# Patient Record
Sex: FEMALE | Race: WHITE | NOT HISPANIC OR LATINO | URBAN - METROPOLITAN AREA
[De-identification: names, ages, dates, MRNs, and addresses within clinical notes are randomized per-mention and may not be internally consistent; named-entity substitution may affect disease eponyms.]

---

## 2021-09-21 ENCOUNTER — OFFICE (OUTPATIENT)
Dept: URBAN - METROPOLITAN AREA CLINIC 19 | Facility: CLINIC | Age: 86
End: 2021-09-21
Payer: COMMERCIAL

## 2021-09-21 VITALS
OXYGEN SATURATION: 94 % | HEIGHT: 62 IN | DIASTOLIC BLOOD PRESSURE: 41 MMHG | WEIGHT: 106 LBS | SYSTOLIC BLOOD PRESSURE: 109 MMHG | HEART RATE: 70 BPM

## 2021-09-21 DIAGNOSIS — R10.9 UNSPECIFIED ABDOMINAL PAIN: ICD-10-CM

## 2021-09-21 DIAGNOSIS — K59.00 CONSTIPATION, UNSPECIFIED: ICD-10-CM

## 2021-09-21 DIAGNOSIS — N39.0 URINARY TRACT INFECTION, SITE NOT SPECIFIED: ICD-10-CM

## 2021-09-21 LAB
COMP. METABOLIC PANEL (14): A/G RATIO: 1.7 (ref 1.2–2.2)
COMP. METABOLIC PANEL (14): ALBUMIN: 4.4 G/DL (ref 3.6–4.6)
COMP. METABOLIC PANEL (14): ALKALINE PHOSPHATASE: 68 IU/L (ref 44–121)
COMP. METABOLIC PANEL (14): ALT (SGPT): 10 IU/L (ref 0–32)
COMP. METABOLIC PANEL (14): AST (SGOT): 18 IU/L (ref 0–40)
COMP. METABOLIC PANEL (14): BILIRUBIN, TOTAL: 0.2 MG/DL (ref 0–1.2)
COMP. METABOLIC PANEL (14): BUN/CREATININE RATIO: 19 (ref 12–28)
COMP. METABOLIC PANEL (14): BUN: 15 MG/DL (ref 8–27)
COMP. METABOLIC PANEL (14): CALCIUM: 9.4 MG/DL (ref 8.7–10.3)
COMP. METABOLIC PANEL (14): CARBON DIOXIDE, TOTAL: 24 MMOL/L (ref 20–29)
COMP. METABOLIC PANEL (14): CHLORIDE: 104 MMOL/L (ref 96–106)
COMP. METABOLIC PANEL (14): CREATININE: 0.77 MG/DL (ref 0.57–1)
COMP. METABOLIC PANEL (14): EGFR IF AFRICN AM: 82 ML/MIN/1.73 (ref 59–?)
COMP. METABOLIC PANEL (14): EGFR IF NONAFRICN AM: 71 ML/MIN/1.73 (ref 59–?)
COMP. METABOLIC PANEL (14): GLOBULIN, TOTAL: 2.6 G/DL (ref 1.5–4.5)
COMP. METABOLIC PANEL (14): GLUCOSE: 93 MG/DL (ref 65–99)
COMP. METABOLIC PANEL (14): POTASSIUM: 4 MMOL/L (ref 3.5–5.2)
COMP. METABOLIC PANEL (14): PROTEIN, TOTAL: 7 G/DL (ref 6–8.5)
COMP. METABOLIC PANEL (14): SODIUM: 140 MMOL/L (ref 134–144)

## 2021-09-21 PROCEDURE — 99204 OFFICE O/P NEW MOD 45 MIN: CPT | Performed by: INTERNAL MEDICINE

## 2021-09-22 LAB
MICROSCOPIC EXAMINATION: BACTERIA: (no result)
MICROSCOPIC EXAMINATION: CASTS: (no result) /LPF
MICROSCOPIC EXAMINATION: EPITHELIAL CELLS (NON RENAL): (no result) /HPF
MICROSCOPIC EXAMINATION: RBC: (no result) /HPF
MICROSCOPIC EXAMINATION: WBC: (no result) /HPF
REQUEST PROBLEM: (no result)
URINALYSIS, COMPLETE: APPEARANCE: CLEAR
URINALYSIS, COMPLETE: BILIRUBIN: NEGATIVE
URINALYSIS, COMPLETE: GLUCOSE: NEGATIVE
URINALYSIS, COMPLETE: KETONES: NEGATIVE
URINALYSIS, COMPLETE: MICROSCOPIC EXAMINATION: (no result)
URINALYSIS, COMPLETE: NITRITE, URINE: NEGATIVE
URINALYSIS, COMPLETE: OCCULT BLOOD: NEGATIVE
URINALYSIS, COMPLETE: PH: 5 (ref 5–7.5)
URINALYSIS, COMPLETE: PROTEIN: NEGATIVE
URINALYSIS, COMPLETE: SPECIFIC GRAVITY: 1.02 (ref 1–1.03)
URINALYSIS, COMPLETE: URINE-COLOR: YELLOW
URINALYSIS, COMPLETE: UROBILINOGEN,SEMI-QN: 0.2 MG/DL (ref 0.2–1)
URINALYSIS, COMPLETE: WBC ESTERASE: ABNORMAL
URINE CULTURE,COMPREHENSIVE: (no result)

## 2023-12-14 ENCOUNTER — OFFICE (OUTPATIENT)
Dept: URBAN - METROPOLITAN AREA CLINIC 19 | Facility: CLINIC | Age: 88
End: 2023-12-14
Payer: MEDICARE

## 2023-12-14 VITALS
HEART RATE: 99 BPM | DIASTOLIC BLOOD PRESSURE: 93 MMHG | HEIGHT: 62 IN | SYSTOLIC BLOOD PRESSURE: 117 MMHG | OXYGEN SATURATION: 100 % | WEIGHT: 102 LBS

## 2023-12-14 DIAGNOSIS — Z86.010 PERSONAL HISTORY OF COLONIC POLYPS: ICD-10-CM

## 2023-12-14 DIAGNOSIS — R51.9 HEADACHE, UNSPECIFIED: ICD-10-CM

## 2023-12-14 DIAGNOSIS — R14.0 ABDOMINAL DISTENSION (GASEOUS): ICD-10-CM

## 2023-12-14 DIAGNOSIS — R11.0 NAUSEA: ICD-10-CM

## 2023-12-14 DIAGNOSIS — K59.00 CONSTIPATION, UNSPECIFIED: ICD-10-CM

## 2023-12-14 DIAGNOSIS — K56.41 FECAL IMPACTION: ICD-10-CM

## 2023-12-14 PROCEDURE — 99214 OFFICE O/P EST MOD 30 MIN: CPT | Performed by: NURSE PRACTITIONER

## 2023-12-14 RX ORDER — LINACLOTIDE 145 UG/1
CAPSULE, GELATIN COATED ORAL
Qty: 30 | Refills: 11 | Status: COMPLETED
Start: 2023-12-14 | End: 2024-01-25

## 2023-12-14 NOTE — SERVICEHPINOTES
Ms. Mccullough is an 88-year-old female with PMH including OA, colon polyps, chronic constipation, chronic HA's.
br
kimi She presents to clinic today with c/o constipation and nausea. She was previously seen by Dr. Correa. Her  is with her today.
br
Rubéntates that she has a "bad stomach problem. " She will go a week or more without a bowel movement.  She also feels sick to her stomach at this time and can have nausea.  She is fatigued and has bloating.  She has been using Senokot for her bowel movements only.  She has some mild anorexia which she believes may be due to the constipation.
br
brNo melena, hematochezia, rectal pain, dysphagia, odynophagia, nausea/vomiting, urinary symptoms.  She will manually disimpact in order to have a bowel movement.
kimi Montanatates she has been on Nurtec for her headaches.  She has bilateral swelling in her hands from her arthritis. Her  believes she had imaging done of the abdomen with Ángel Steel NP. I will request this.  States she feels the stool sitting in rectum but has to manually disimpact.
kimi
brCOLONOSCOPY 2021: Moderate internal hemorrhoids, pandiverticulosis, colon polyps, adequate prep. Pathology revealed 3 tubular adenomas without high-grade dysplasia, 1 hyperplastic polyp.

## 2023-12-15 LAB
CBC WITH DIFFERENTIAL/PLATELET: BASO (ABSOLUTE): 0.1 X10E3/UL (ref 0–0.2)
CBC WITH DIFFERENTIAL/PLATELET: BASOS: 1 %
CBC WITH DIFFERENTIAL/PLATELET: EOS (ABSOLUTE): 0.1 X10E3/UL (ref 0–0.4)
CBC WITH DIFFERENTIAL/PLATELET: EOS: 2 %
CBC WITH DIFFERENTIAL/PLATELET: HEMATOCRIT: 36.7 % (ref 34–46.6)
CBC WITH DIFFERENTIAL/PLATELET: HEMOGLOBIN: 12.1 G/DL (ref 11.1–15.9)
CBC WITH DIFFERENTIAL/PLATELET: IMMATURE GRANS (ABS): 0 X10E3/UL (ref 0–0.1)
CBC WITH DIFFERENTIAL/PLATELET: IMMATURE GRANULOCYTES: 0 %
CBC WITH DIFFERENTIAL/PLATELET: LYMPHS (ABSOLUTE): 1.7 X10E3/UL (ref 0.7–3.1)
CBC WITH DIFFERENTIAL/PLATELET: LYMPHS: 30 %
CBC WITH DIFFERENTIAL/PLATELET: MCH: 31 PG (ref 26.6–33)
CBC WITH DIFFERENTIAL/PLATELET: MCHC: 33 G/DL (ref 31.5–35.7)
CBC WITH DIFFERENTIAL/PLATELET: MCV: 94 FL (ref 79–97)
CBC WITH DIFFERENTIAL/PLATELET: MONOCYTES(ABSOLUTE): 0.6 X10E3/UL (ref 0.1–0.9)
CBC WITH DIFFERENTIAL/PLATELET: MONOCYTES: 10 %
CBC WITH DIFFERENTIAL/PLATELET: NEUTROPHILS (ABSOLUTE): 3.2 X10E3/UL (ref 1.4–7)
CBC WITH DIFFERENTIAL/PLATELET: NEUTROPHILS: 57 %
CBC WITH DIFFERENTIAL/PLATELET: PLATELETS: 231 X10E3/UL (ref 150–450)
CBC WITH DIFFERENTIAL/PLATELET: RBC: 3.9 X10E6/UL (ref 3.77–5.28)
CBC WITH DIFFERENTIAL/PLATELET: RDW: 12.5 % (ref 11.7–15.4)
CBC WITH DIFFERENTIAL/PLATELET: WBC: 5.6 X10E3/UL (ref 3.4–10.8)
COMP. METABOLIC PANEL (14): A/G RATIO: 1.7 (ref 1.2–2.2)
COMP. METABOLIC PANEL (14): ALBUMIN: 4.1 G/DL (ref 3.7–4.7)
COMP. METABOLIC PANEL (14): ALKALINE PHOSPHATASE: 69 IU/L (ref 44–121)
COMP. METABOLIC PANEL (14): ALT (SGPT): 10 IU/L (ref 0–32)
COMP. METABOLIC PANEL (14): AST (SGOT): 17 IU/L (ref 0–40)
COMP. METABOLIC PANEL (14): BILIRUBIN, TOTAL: <0.2 MG/DL
COMP. METABOLIC PANEL (14): BUN/CREATININE RATIO: 41 — HIGH (ref 12–28)
COMP. METABOLIC PANEL (14): BUN: 30 MG/DL — HIGH (ref 8–27)
COMP. METABOLIC PANEL (14): CALCIUM: 9.2 MG/DL (ref 8.7–10.3)
COMP. METABOLIC PANEL (14): CARBON DIOXIDE, TOTAL: 22 MMOL/L (ref 20–29)
COMP. METABOLIC PANEL (14): CHLORIDE: 107 MMOL/L — HIGH (ref 96–106)
COMP. METABOLIC PANEL (14): CREATININE: 0.73 MG/DL (ref 0.57–1)
COMP. METABOLIC PANEL (14): EGFR: 80 ML/MIN/1.73 (ref 59–?)
COMP. METABOLIC PANEL (14): GLOBULIN, TOTAL: 2.4 G/DL (ref 1.5–4.5)
COMP. METABOLIC PANEL (14): GLUCOSE: 89 MG/DL (ref 70–99)
COMP. METABOLIC PANEL (14): POTASSIUM: 3.8 MMOL/L (ref 3.5–5.2)
COMP. METABOLIC PANEL (14): PROTEIN, TOTAL: 6.5 G/DL (ref 6–8.5)
COMP. METABOLIC PANEL (14): SODIUM: 142 MMOL/L (ref 134–144)
TSH: 1.08 UIU/ML (ref 0.45–4.5)

## 2024-01-25 ENCOUNTER — OFFICE (OUTPATIENT)
Dept: URBAN - METROPOLITAN AREA CLINIC 19 | Facility: CLINIC | Age: 89
End: 2024-01-25
Payer: MEDICARE

## 2024-01-25 VITALS
HEIGHT: 62 IN | WEIGHT: 101 LBS | HEART RATE: 85 BPM | OXYGEN SATURATION: 100 % | SYSTOLIC BLOOD PRESSURE: 124 MMHG | DIASTOLIC BLOOD PRESSURE: 62 MMHG

## 2024-01-25 DIAGNOSIS — R11.0 NAUSEA: ICD-10-CM

## 2024-01-25 DIAGNOSIS — N64.4 MASTODYNIA: ICD-10-CM

## 2024-01-25 DIAGNOSIS — R10.13 EPIGASTRIC PAIN: ICD-10-CM

## 2024-01-25 PROCEDURE — 99214 OFFICE O/P EST MOD 30 MIN: CPT | Performed by: NURSE PRACTITIONER

## 2024-01-25 RX ORDER — PANTOPRAZOLE SODIUM 40 MG/1
40 TABLET, DELAYED RELEASE ORAL
Qty: 30 | Refills: 11 | Status: COMPLETED
Start: 2024-01-25 | End: 2024-03-04

## 2024-01-25 RX ORDER — ONDANSETRON 4 MG/1
TABLET, ORALLY DISINTEGRATING ORAL
Qty: 30 | Refills: 1 | Status: COMPLETED
Start: 2024-01-25 | End: 2024-03-04 | Stop reason: SDUPTHER

## 2024-01-25 NOTE — SERVICEHPINOTES
Ms. Mccullough is an 88-year-old female with PMH including OA, colon polyps, chronic constipation, chronic HA's.UPDATE BY TOMY PIRES 12/14/23:
br She presents to clinic today with c/o constipation and nausea. She was previously seen by Dr. Correa. Her  is with her today.States that she has a "bad stomach problem. " She will go a week or more without a bowel movement.  She also feels sick to her stomach at this time and can have nausea.  She is fatigued and has bloating.  She has been using Senokot for her bowel movements only.  She has some mild anorexia which she believes may be due to the constipation.No melena, hematochezia, rectal pain, dysphagia, odynophagia, nausea/vomiting, urinary symptoms.  She will manually disimpact in order to have a bowel movement.States she has been on Nurtec for her headaches.  She has bilateral swelling in her hands from her arthritis. Her  believes she had imaging done of the abdomen with Ángel Steel NP. I will request this.  States she feels the stool sitting in rectum but has to manually disimpact.
br
br Recommendations included pelvic floor therapy, water, Mag citrate x 2 days, Metamucil, CBC, CMP, TSH, Linzess. All labs normal. Lactulose breath test canceled. COLONOSCOPY 2021: Moderate internal hemorrhoids, pandiverticulosis, colon polyps, adequate prep. Pathology revealed 3 tubular adenomas without high-grade dysplasia, 1 hyperplastic polyp. 
br
br   UPDATE BY TOMY PIRES 1/25/24:
br
Lesvia Mccullough presents to clinic today with c/o nausea, epigastric pain. Her  is with her. She tried Linzess only x 1 and had diarrhea and stopped taking this. 
br
br
She has chronic nausea, epigastric pain. She went to the ER at [facility] 2-3 weeks ago and had a negative CT per . She has bilateral breast pain and would like to see someone for this. She recently had to give up her dog and is very sad.
br
br No melena, hematochezia, dysphagia, odynophagia, fever, vomiting, issue with seeing or smelling food, rectal pain.
br
No NSAID use.

## 2024-01-27 LAB
H PYLORI BREATH TEST: NEGATIVE
H. PYLORI BREATH COLLECTION: (no result)

## 2024-03-04 ENCOUNTER — AMBULATORY SURGICAL CENTER (OUTPATIENT)
Dept: URBAN - METROPOLITAN AREA SURGERY 2 | Facility: SURGERY | Age: 89
End: 2024-03-04
Payer: MEDICARE

## 2024-03-04 ENCOUNTER — OFFICE (OUTPATIENT)
Dept: URBAN - METROPOLITAN AREA PATHOLOGY 12 | Facility: PATHOLOGY | Age: 89
End: 2024-03-04
Payer: MEDICARE

## 2024-03-04 ENCOUNTER — AMBULATORY SURGICAL CENTER (OUTPATIENT)
Dept: URBAN - METROPOLITAN AREA SURGERY 2 | Facility: SURGERY | Age: 89
End: 2024-03-04

## 2024-03-04 VITALS
WEIGHT: 101 LBS | DIASTOLIC BLOOD PRESSURE: 67 MMHG | DIASTOLIC BLOOD PRESSURE: 70 MMHG | OXYGEN SATURATION: 99 % | DIASTOLIC BLOOD PRESSURE: 70 MMHG | HEART RATE: 92 BPM | HEART RATE: 83 BPM | OXYGEN SATURATION: 99 % | DIASTOLIC BLOOD PRESSURE: 64 MMHG | SYSTOLIC BLOOD PRESSURE: 142 MMHG | SYSTOLIC BLOOD PRESSURE: 127 MMHG | TEMPERATURE: 98.5 F | OXYGEN SATURATION: 97 % | WEIGHT: 101 LBS | RESPIRATION RATE: 16 BRPM | DIASTOLIC BLOOD PRESSURE: 64 MMHG | DIASTOLIC BLOOD PRESSURE: 67 MMHG | RESPIRATION RATE: 18 BRPM | TEMPERATURE: 98.5 F | SYSTOLIC BLOOD PRESSURE: 127 MMHG | OXYGEN SATURATION: 97 % | HEIGHT: 62 IN | OXYGEN SATURATION: 97 % | HEART RATE: 83 BPM | TEMPERATURE: 98.5 F | DIASTOLIC BLOOD PRESSURE: 70 MMHG | SYSTOLIC BLOOD PRESSURE: 125 MMHG | RESPIRATION RATE: 17 BRPM | SYSTOLIC BLOOD PRESSURE: 126 MMHG | DIASTOLIC BLOOD PRESSURE: 67 MMHG | RESPIRATION RATE: 16 BRPM | TEMPERATURE: 97.2 F | RESPIRATION RATE: 17 BRPM | HEART RATE: 80 BPM | RESPIRATION RATE: 18 BRPM | SYSTOLIC BLOOD PRESSURE: 142 MMHG | SYSTOLIC BLOOD PRESSURE: 135 MMHG | DIASTOLIC BLOOD PRESSURE: 58 MMHG | SYSTOLIC BLOOD PRESSURE: 135 MMHG | WEIGHT: 101 LBS | OXYGEN SATURATION: 100 % | DIASTOLIC BLOOD PRESSURE: 58 MMHG | HEART RATE: 83 BPM | HEART RATE: 82 BPM | HEIGHT: 62 IN | OXYGEN SATURATION: 100 % | SYSTOLIC BLOOD PRESSURE: 135 MMHG | TEMPERATURE: 97.2 F | OXYGEN SATURATION: 99 % | SYSTOLIC BLOOD PRESSURE: 126 MMHG | DIASTOLIC BLOOD PRESSURE: 76 MMHG | HEART RATE: 92 BPM | SYSTOLIC BLOOD PRESSURE: 142 MMHG | SYSTOLIC BLOOD PRESSURE: 125 MMHG | HEART RATE: 80 BPM | SYSTOLIC BLOOD PRESSURE: 127 MMHG | TEMPERATURE: 97.2 F | SYSTOLIC BLOOD PRESSURE: 126 MMHG | DIASTOLIC BLOOD PRESSURE: 76 MMHG | HEART RATE: 82 BPM | HEIGHT: 62 IN | RESPIRATION RATE: 17 BRPM | SYSTOLIC BLOOD PRESSURE: 125 MMHG | DIASTOLIC BLOOD PRESSURE: 58 MMHG | OXYGEN SATURATION: 100 % | HEART RATE: 80 BPM | RESPIRATION RATE: 16 BRPM | DIASTOLIC BLOOD PRESSURE: 76 MMHG | HEART RATE: 92 BPM | RESPIRATION RATE: 18 BRPM | HEART RATE: 82 BPM | DIASTOLIC BLOOD PRESSURE: 64 MMHG

## 2024-03-04 DIAGNOSIS — K31.89 OTHER DISEASES OF STOMACH AND DUODENUM: ICD-10-CM

## 2024-03-04 DIAGNOSIS — R11.0 NAUSEA: ICD-10-CM

## 2024-03-04 DIAGNOSIS — R10.13 EPIGASTRIC PAIN: ICD-10-CM

## 2024-03-04 DIAGNOSIS — R63.0 ANOREXIA: ICD-10-CM

## 2024-03-04 PROCEDURE — 43239 EGD BIOPSY SINGLE/MULTIPLE: CPT | Performed by: INTERNAL MEDICINE

## 2024-03-04 PROCEDURE — 88313 SPECIAL STAINS GROUP 2: CPT | Performed by: STUDENT IN AN ORGANIZED HEALTH CARE EDUCATION/TRAINING PROGRAM

## 2024-03-04 PROCEDURE — 88305 TISSUE EXAM BY PATHOLOGIST: CPT | Performed by: STUDENT IN AN ORGANIZED HEALTH CARE EDUCATION/TRAINING PROGRAM

## 2024-03-04 PROCEDURE — 88342 IMHCHEM/IMCYTCHM 1ST ANTB: CPT | Performed by: STUDENT IN AN ORGANIZED HEALTH CARE EDUCATION/TRAINING PROGRAM

## 2024-03-04 RX ORDER — PANTOPRAZOLE SODIUM 40 MG/1
40 TABLET, DELAYED RELEASE ORAL
Qty: 30 | Refills: 11 | Status: COMPLETED
Start: 2024-03-04 | End: 2024-05-08

## 2024-03-04 RX ADMIN — PROPOFOL 70 MG: 10 INJECTION, EMULSION INTRAVENOUS at 13:20

## 2024-03-06 LAB
GASTRO ONE PATHOLOGY: PDF REPORT: (no result)

## 2024-03-07 ENCOUNTER — OFFICE (OUTPATIENT)
Dept: URBAN - METROPOLITAN AREA CLINIC 19 | Facility: CLINIC | Age: 89
End: 2024-03-07
Payer: MEDICARE

## 2024-03-07 VITALS
HEIGHT: 62 IN | HEART RATE: 90 BPM | WEIGHT: 103 LBS | SYSTOLIC BLOOD PRESSURE: 105 MMHG | DIASTOLIC BLOOD PRESSURE: 68 MMHG | OXYGEN SATURATION: 98 %

## 2024-03-07 DIAGNOSIS — K59.00 CONSTIPATION, UNSPECIFIED: ICD-10-CM

## 2024-03-07 DIAGNOSIS — R10.84 GENERALIZED ABDOMINAL PAIN: ICD-10-CM

## 2024-03-07 DIAGNOSIS — K31.9 DISEASE OF STOMACH AND DUODENUM, UNSPECIFIED: ICD-10-CM

## 2024-03-07 DIAGNOSIS — R14.0 ABDOMINAL DISTENSION (GASEOUS): ICD-10-CM

## 2024-03-07 PROCEDURE — 99213 OFFICE O/P EST LOW 20 MIN: CPT | Performed by: NURSE PRACTITIONER

## 2024-03-07 NOTE — SERVICEHPINOTES
Ms. Mccullough is an 88-year-old female with PMH including OA, colon polyps, chronic constipation, chronic HA's.UPDATE BY TOMY PIRES 12/14/23:Laina presents to clinic today with c/o constipation and nausea. She was previously seen by Dr. Correa. Her  is with her today.States that she has a "bad stomach problem. " She will go a week or more without a bowel movement.  She also feels sick to her stomach at this time and can have nausea.  She is fatigued and has bloating.  She has been using Senokot for her bowel movements only.  She has some mild anorexia which she believes may be due to the constipation.No melena, hematochezia, rectal pain, dysphagia, odynophagia, nausea/vomiting, urinary symptoms.  She will manually disimpact in order to have a bowel movement.States she has been on Nurtec for her headaches.  She has bilateral swelling in her hands from her arthritis. Her  believes she had imaging done of the abdomen with Ángel Steel NP. I will request this.  States she feels the stool sitting in rectum but has to manually disimpact.Recommendations included pelvic floor therapy, water, Mag citrate x 2 days, Metamucil, CBC, CMP, TSH, Linzess. All labs normal. Lactulose breath test canceled. COLONOSCOPY 2021: Moderate internal hemorrhoids, pandiverticulosis, colon polyps, adequate prep. Pathology revealed 3 tubular adenomas without high-grade dysplasia, 1 hyperplastic polyp.UPDATE BY TOMY PIRES 1/25/24:Ms. Mccullough presents to clinic today with c/o nausea, epigastric pain. Her  is with her. She tried Linzess only x 1 and had diarrhea and stopped taking this. She has chronic nausea, epigastric pain. She went to the ER at [facility] [place] 2-3 weeks ago and had a negative CT per . She has bilateral breast pain and would like to see someone for this. She recently had to give up her dog and is very sad.No melena, hematochezia, dysphagia, odynophagia, fever, vomiting, issue with seeing or smelling food, rectal pain.brNo NSAID use. 
br
br  Recommendations included H. pylori breath test (negative), EGD, pantoprazole, zofran, referral to OBGYN for breast pain.
br
br EGD BY DR. LEACH 3/4/24:
br
Impressionsbr- Normal duodenumbr- Normal esophagusbr- Erythema, granularity, scarring and heaped-up margins in the incisura of the stomach. (Biopsy)
brPath: H. pylori negative, moderate intestinal metaplasia 
br Recommendations included follow-up in 2 months, PPI.
br
br UPDATE BY TOMY PIRES 3/7/24:
br
kimi Ms. Mccullough presents to clinic today with her  with c/o abdominal bloating. Her  is with her.
kimi Marina has not started the proton pump inhibitor due to fear.  Her bloating is worse after eating meals.  She does eat cane sugar, dairy, vegetables, etc.  She eats about 3 meals a day.  Her appetite is fair.
br
kimiNo melena, hematochezia, hematemesis, coffee-ground emesis, dysphagia, odynophagia, rectal pain, diarrhea, vomiting.  She may have a bowel movement once a week or so. Not willing to take medication for this.

## 2024-03-07 NOTE — SERVICENOTES
Sent to Dr. Anthony for review. If above negative will proceed with colonoscopy. Believe this is food-related. She does have symptoms of dementia.

## 2024-03-18 ENCOUNTER — OFFICE (OUTPATIENT)
Dept: URBAN - METROPOLITAN AREA CLINIC 22 | Facility: CLINIC | Age: 89
End: 2024-03-18

## 2024-03-18 DIAGNOSIS — R10.84 GENERALIZED ABDOMINAL PAIN: ICD-10-CM

## 2024-03-18 DIAGNOSIS — R14.0 ABDOMINAL DISTENSION (GASEOUS): ICD-10-CM

## 2024-03-18 DIAGNOSIS — K59.00 CONSTIPATION, UNSPECIFIED: ICD-10-CM

## 2024-03-18 PROCEDURE — 74176 CT ABD & PELVIS W/O CONTRAST: CPT | Mod: TC | Performed by: NURSE PRACTITIONER

## 2024-05-08 ENCOUNTER — OFFICE (OUTPATIENT)
Dept: URBAN - METROPOLITAN AREA CLINIC 19 | Facility: CLINIC | Age: 89
End: 2024-05-08
Payer: MEDICARE

## 2024-05-08 VITALS
SYSTOLIC BLOOD PRESSURE: 130 MMHG | DIASTOLIC BLOOD PRESSURE: 69 MMHG | HEIGHT: 62 IN | OXYGEN SATURATION: 99 % | WEIGHT: 101 LBS | HEART RATE: 88 BPM

## 2024-05-08 DIAGNOSIS — R51.9 HEADACHE, UNSPECIFIED: ICD-10-CM

## 2024-05-08 DIAGNOSIS — K59.00 CONSTIPATION, UNSPECIFIED: ICD-10-CM

## 2024-05-08 DIAGNOSIS — R14.0 ABDOMINAL DISTENSION (GASEOUS): ICD-10-CM

## 2024-05-08 PROCEDURE — 99214 OFFICE O/P EST MOD 30 MIN: CPT | Performed by: NURSE PRACTITIONER

## 2024-05-08 RX ORDER — SODIUM PICOSULFATE, MAGNESIUM OXIDE, AND ANHYDROUS CITRIC ACID 12; 3.5; 1 G/175ML; G/175ML; MG/175ML
LIQUID ORAL
Qty: 1 | Refills: 0 | Status: COMPLETED
Start: 2024-05-08 | End: 2024-06-13

## 2024-05-08 RX ORDER — RIFAXIMIN 550 MG/1
TABLET ORAL
Qty: 42 | Refills: 0 | Status: COMPLETED
Start: 2024-05-08 | End: 2024-06-13

## 2024-05-08 NOTE — SERVICENOTES
Sent to Dr. Anthony for review. Wait on colonoscopy until she tries papaya/kiwi enzyme, Xifaxan, limiting gas-producing foods. Will send myself a task to check on her in a few weeks.

## 2024-05-08 NOTE — SERVICEHPINOTES
Ms. Mccullough is an 88-year-old female with PMH including OA, colon polyps, chronic constipation, chronic HA's.UPDATE BY TOMY PIRES 12/14/23:Laina presents to clinic today with c/o constipation and nausea. She was previously seen by Dr. Correa. Her  is with her today.States that she has a "bad stomach problem. " She will go a week or more without a bowel movement.  She also feels sick to her stomach at this time and can have nausea.  She is fatigued and has bloating.  She has been using Senokot for her bowel movements only.  She has some mild anorexia which she believes may be due to the constipation.No melena, hematochezia, rectal pain, dysphagia, odynophagia, nausea/vomiting, urinary symptoms.  She will manually disimpact in order to have a bowel movement.States she has been on Nurtec for her headaches.  She has bilateral swelling in her hands from her arthritis. Her  believes she had imaging done of the abdomen with Ángel Steel NP. I will request this.  States she feels the stool sitting in rectum but has to manually disimpact.Recommendations included pelvic floor therapy, water, Mag citrate x 2 days, Metamucil, CBC, CMP, TSH, Linzess. All labs normal. Lactulose breath test canceled. COLONOSCOPY 2021: Moderate internal hemorrhoids, pandiverticulosis, colon polyps, adequate prep. Pathology revealed 3 tubular adenomas without high-grade dysplasia, 1 hyperplastic polyp.UPDATE BY TOMY PIRES 1/25/24:Ms. Mccullough presents to clinic today with c/o nausea, epigastric pain. Her  is with her. She tried Linzess only x 1 and had diarrhea and stopped taking this. She has chronic nausea, epigastric pain. She went to the ER at [facility] 2-3 weeks ago and had a negative CT per . She has bilateral breast pain and would like to see someone for this. She recently had to give up her dog and is very sad.No melena, hematochezia, dysphagia, odynophagia, fever, vomiting, issue with seeing or smelling food, rectal pain.brNo NSAID use.Recommendations included H. pylori breath test (negative), EGD, pantoprazole, zofran, referral to OBGYN for breast pain.EGD BY DR. ANTHONY 3/4/24:brImpressionsbr- Normal duodenumbr- Normal esophagusbr- Erythema, granularity, scarring and heaped-up margins in the incisura of the stomach. (Biopsy)brPath: H. pylori negative, moderate intestinal metaplasia brRecommendations included follow-up in 2 months, PPI.UPDATE BY TOMY PIRES 3/7/24:Ms. Mccullough presents to clinic today with her  with c/o abdominal bloating. Her  is with her.She has not started the proton pump inhibitor due to fear.  Her bloating is worse after eating meals.  She does eat cane sugar, dairy, vegetables, etc.  She eats about 3 meals a day.  Her appetite is fair.No melena, hematochezia, hematemesis, coffee-ground emesis, dysphagia, odynophagia, rectal pain, diarrhea, vomiting.  She may have a bowel movement once a week or so. Not willing to take medication for this. 
br
br   Recommendations included CT AP w/o contrast, creatinine, lactulose breath test (cancelled), sucrose breath test (not done), GF diet, limit dairy, continue PPI. CT abdomen and pelvis without contrast on 03/18/2024 revealing sigmoid diverticulosis without evidence of diverticulitis, no upper abdominal ascites, spleen, pancreas and adrenal glands nonrevealing, no nephrolithiasis, subtle 5 mm hypodense lesion in the lateral left lobe of the liver likely hepatic cyst or hemangioma.
br 
br
UPDATE BY TOMY PIRES 5/8/24:
br 
br
Ms. Mccullough presents to clinic today with her  to discuss her abdominal bloating. She cancelled the lactulose breath test. We discussed taking Xifaxan to see if this helps and not doing the breath test. Her bloating is postprandial. She has chronic constipation. She eats ice cream. They eat chicken, vegetables, a starch at night. 
br
brNo melena, hematochezia, hematemesis, coffee-ground emesis, dysphagia, odynophagia, anorexia, unintentional weight loss, diarrhea, rectal pain.  We discussed trying papaya enzyme or kiwi enzyme for IBS C. Also gave her handout on limiting gas producing foods from up-to-date.  I did order the colonoscopy as discussed with Dr. Anthony but will wait and see how she does with the papaya enzyme and Xifaxan.br
br

## 2024-06-13 ENCOUNTER — OFFICE (OUTPATIENT)
Dept: URBAN - METROPOLITAN AREA CLINIC 19 | Facility: CLINIC | Age: 89
End: 2024-06-13
Payer: MEDICARE

## 2024-06-13 VITALS
HEART RATE: 78 BPM | DIASTOLIC BLOOD PRESSURE: 69 MMHG | WEIGHT: 101 LBS | HEIGHT: 62 IN | OXYGEN SATURATION: 98 % | SYSTOLIC BLOOD PRESSURE: 125 MMHG

## 2024-06-13 DIAGNOSIS — K59.00 CONSTIPATION, UNSPECIFIED: ICD-10-CM

## 2024-06-13 DIAGNOSIS — R14.0 ABDOMINAL DISTENSION (GASEOUS): ICD-10-CM

## 2024-06-13 PROCEDURE — 99213 OFFICE O/P EST LOW 20 MIN: CPT | Performed by: NURSE PRACTITIONER

## 2024-06-13 RX ORDER — RIFAXIMIN 550 MG/1
TABLET ORAL
Qty: 27 | Refills: 0 | Status: ACTIVE
Start: 2024-06-13

## 2024-06-13 NOTE — SERVICENOTES
Sent to Dr. Anthony for review. We discussed colonoscopy but they prefer to defer. She is 88 years old.

## 2024-06-13 NOTE — SERVICEHPINOTES
Ms. Mccullough is an 88-year-old female with PMH including OA, colon polyps, chronic constipation, chronic HA's.UPDATE BY TOMY PIRES 12/14/23:Laina presents to clinic today with c/o constipation and nausea. She was previously seen by Dr. Correa. Her  is with her today.States that she has a "bad stomach problem. " She will go a week or more without a bowel movement.  She also feels sick to her stomach at this time and can have nausea.  She is fatigued and has bloating.  She has been using Senokot for her bowel movements only.  She has some mild anorexia which she believes may be due to the constipation.No melena, hematochezia, rectal pain, dysphagia, odynophagia, nausea/vomiting, urinary symptoms.  She will manually disimpact in order to have a bowel movement.States she has been on Nurtec for her headaches.  She has bilateral swelling in her hands from her arthritis. Her  believes she had imaging done of the abdomen with Ángel Steel NP. I will request this.  States she feels the stool sitting in rectum but has to manually disimpact.Recommendations included pelvic floor therapy, water, Mag citrate x 2 days, Metamucil, CBC, CMP, TSH, Linzess. All labs normal. Lactulose breath test canceled. COLONOSCOPY 2021: Moderate internal hemorrhoids, pandiverticulosis, colon polyps, adequate prep. Pathology revealed 3 tubular adenomas without high-grade dysplasia, 1 hyperplastic polyp.UPDATE BY TOMY PIRES 1/25/24:Ms. Mccullough presents to clinic today with c/o nausea, epigastric pain. Her  is with her. She tried Linzess only x 1 and had diarrhea and stopped taking this. She has chronic nausea, epigastric pain. She went to the ER at [facility] 2-3 weeks ago and had a negative CT per . She has bilateral breast pain and would like to see someone for this. She recently had to give up her dog and is very sad.No melena, hematochezia, dysphagia, odynophagia, fever, vomiting, issue with seeing or smelling food, rectal pain.brNo NSAID use.Recommendations included H. pylori breath test (negative), EGD, pantoprazole, zofran, referral to OBGYN for breast pain.EGD BY DR. ANTHONY 3/4/24:brImpressionsbr- Normal duodenumbr- Normal esophagusbr- Erythema, granularity, scarring and heaped-up margins in the incisura of the stomach. (Biopsy)brPath: H. pylori negative, moderate intestinal metaplasia brRecommendations included follow-up in 2 months, PPI.UPDATE BY TOMY PIRES 3/7/24:Ms. Mccullough presents to clinic today with her  with c/o abdominal bloating. Her  is with her.She has not started the proton pump inhibitor due to fear.  Her bloating is worse after eating meals.  She does eat cane sugar, dairy, vegetables, etc.  She eats about 3 meals a day.  Her appetite is fair.No melena, hematochezia, hematemesis, coffee-ground emesis, dysphagia, odynophagia, rectal pain, diarrhea, vomiting.  She may have a bowel movement once a week or so. Not willing to take medication for this.Recommendations included CT AP w/o contrast, creatinine, lactulose breath test (cancelled), sucrose breath test (not done), GF diet, limit dairy, continue PPI. CT abdomen and pelvis without contrast on 03/18/2024 revealing sigmoid diverticulosis without evidence of diverticulitis, no upper abdominal ascites, spleen, pancreas and adrenal glands nonrevealing, no nephrolithiasis, subtle 5 mm hypodense lesion in the lateral left lobe of the liver likely hepatic cyst or hemangioma.UPDATE BY TOMY PIRES 5/8/24:Ms. Mccullough presents to clinic today with her  to discuss her abdominal bloating. She cancelled the lactulose breath test. We discussed taking Xifaxan to see if this helps and not doing the breath test. Her bloating is postprandial. She has chronic constipation. She eats ice cream. They eat chicken, vegetables, a starch at night. No melena, hematochezia, hematemesis, coffee-ground emesis, dysphagia, odynophagia, anorexia, unintentional weight loss, diarrhea, rectal pain.  We discussed trying papaya enzyme or kiwi enzyme for IBS C. Also gave her handout on limiting gas producing foods from up-to-date.  I did order the colonoscopy as discussed with Dr. Anthony but will wait and see how she does with the papaya enzyme and Xifaxan. 
br
br   Recommendations included colonoscopy, Xifaxan, papaya and kiwi enzyme, peppermint oil capsule, water, follow-up in 3 months, no gum or carbonated beverages.
br
br UPDATE BY TOMY PIRES 6/13/24:
br
kimi 
Ms. Mccullough presents to clinic today with her  to discuss her GI symptoms- bloating and intermittent constipation. Patient does have dementia but her  helps with the questions.
br
br They did not try Xifaxan, papaya or kiwi enzyme, Miralax, stool softener, etc. Please see previous work-up as above including EGD and CT AP. Also, we discussed a colonoscopy but as she is 88 years old they prefer to defer, I agree.
br
brShe has a bowel movement about 3 times a week.  No melena, hematochezia, hematemesis, coffee-ground emesis, dysphagia, odynophagia, early satiety, rectal pain, abdominal pain, unintentional weight loss.
br
br We discussed at least trying Xifaxan and she is willing. I sent this in for her. I will call in 4 weeks to assess symptoms. br
br

## 2024-07-08 ENCOUNTER — OFFICE (OUTPATIENT)
Dept: URBAN - METROPOLITAN AREA CLINIC 19 | Facility: CLINIC | Age: 89
End: 2024-07-08
Payer: MEDICARE

## 2024-07-08 VITALS
SYSTOLIC BLOOD PRESSURE: 124 MMHG | DIASTOLIC BLOOD PRESSURE: 70 MMHG | OXYGEN SATURATION: 99 % | WEIGHT: 101 LBS | HEART RATE: 84 BPM | HEIGHT: 62 IN

## 2024-07-08 DIAGNOSIS — R14.0 ABDOMINAL DISTENSION (GASEOUS): ICD-10-CM

## 2024-07-08 DIAGNOSIS — Z80.0 FAMILY HISTORY OF MALIGNANT NEOPLASM OF DIGESTIVE ORGANS: ICD-10-CM

## 2024-07-08 DIAGNOSIS — K59.00 CONSTIPATION, UNSPECIFIED: ICD-10-CM

## 2024-07-08 DIAGNOSIS — F03.90 UNSPECIFIED DEMENTIA, UNSPECIFIED SEVERITY, WITHOUT BEHAVIOR: ICD-10-CM

## 2024-07-08 DIAGNOSIS — Z86.010 PERSONAL HISTORY OF COLONIC POLYPS: ICD-10-CM

## 2024-07-08 PROCEDURE — 99214 OFFICE O/P EST MOD 30 MIN: CPT | Performed by: NURSE PRACTITIONER

## 2024-07-08 RX ORDER — SODIUM PICOSULFATE, MAGNESIUM OXIDE, AND ANHYDROUS CITRIC ACID 12; 3.5; 1 G/175ML; G/175ML; MG/175ML
LIQUID ORAL
Qty: 1 | Refills: 0 | Status: COMPLETED
Start: 2024-07-08 | End: 2024-08-13

## 2024-07-08 NOTE — SERVICEHPINOTES
Ms. Mccullough is an 88-year-old female with PMH including OA, colon polyps, chronic constipation, chronic HA's.UPDATE BY TOMY PIRES 12/14/23:Laina presents to clinic today with c/o constipation and nausea. She was previously seen by Dr. Correa. Her  is with her today.States that she has a "bad stomach problem. " She will go a week or more without a bowel movement.  She also feels sick to her stomach at this time and can have nausea.  She is fatigued and has bloating.  She has been using Senokot for her bowel movements only.  She has some mild anorexia which she believes may be due to the constipation.No melena, hematochezia, rectal pain, dysphagia, odynophagia, nausea/vomiting, urinary symptoms.  She will manually disimpact in order to have a bowel movement.States she has been on Nurtec for her headaches.  She has bilateral swelling in her hands from her arthritis. Her  believes she had imaging done of the abdomen with Ángel Steel NP. I will request this.  States she feels the stool sitting in rectum but has to manually disimpact.Recommendations included pelvic floor therapy, water, Mag citrate x 2 days, Metamucil, CBC, CMP, TSH, Linzess. All labs normal. Lactulose breath test canceled. COLONOSCOPY 2021: Moderate internal hemorrhoids, pandiverticulosis, colon polyps, adequate prep. Pathology revealed 3 tubular adenomas without high-grade dysplasia, 1 hyperplastic polyp.UPDATE BY TOMY PIRES 1/25/24:Ms. Mccullough presents to clinic today with c/o nausea, epigastric pain. Her  is with her. She tried Linzess only x 1 and had diarrhea and stopped taking this. She has chronic nausea, epigastric pain. She went to the ER at [facility] 2[facility]3 weeks ago and had a negative CT per . She has bilateral breast pain and would like to see someone for this. She recently had to give up her dog and is very sad.No melena, hematochezia, dysphagia, odynophagia, fever, vomiting, issue with seeing or smelling food, rectal pain.brNo NSAID use.Recommendations included H. pylori breath test (negative), EGD, pantoprazole, zofran, referral to OBGYN for breast pain.EGD BY DR. ANTHONY 3/4/24:brImpressionsbr- Normal duodenumbr- Normal esophagusbr- Erythema, granularity, scarring and heaped-up margins in the incisura of the stomach. (Biopsy)brPath: H. pylori negative, moderate intestinal metaplasia brRecommendations included follow-up in 2 months, PPI.UPDATE BY TOMY PIRES 3/7/24:Ms. Mccullough presents to clinic today with her  with c/o abdominal bloating. Her  is with her.She has not started the proton pump inhibitor due to fear.  Her bloating is worse after eating meals.  She does eat cane sugar, dairy, vegetables, etc.  She eats about 3 meals a day.  Her appetite is fair.No melena, hematochezia, hematemesis, coffee-ground emesis, dysphagia, odynophagia, rectal pain, diarrhea, vomiting.  She may have a bowel movement once a week or so. Not willing to take medication for this.Recommendations included CT AP w/o contrast, creatinine, lactulose breath test (cancelled), sucrose breath test (not done), GF diet, limit dairy, continue PPI. CT abdomen and pelvis without contrast on 03/18/2024 revealing sigmoid diverticulosis without evidence of diverticulitis, no upper abdominal ascites, spleen, pancreas and adrenal glands nonrevealing, no nephrolithiasis, subtle 5 mm hypodense lesion in the lateral left lobe of the liver likely hepatic cyst or hemangioma.UPDATE BY TOMY PIRES 5/8/24:Ms. Mccullough presents to clinic today with her  to discuss her abdominal bloating. She cancelled the lactulose breath test. We discussed taking Xifaxan to see if this helps and not doing the breath test. Her bloating is postprandial. She has chronic constipation. She eats ice cream. They eat chicken, vegetables, a starch at night. No melena, hematochezia, hematemesis, coffee-ground emesis, dysphagia, odynophagia, anorexia, unintentional weight loss, diarrhea, rectal pain.  We discussed trying papaya enzyme or kiwi enzyme for IBS C. Also gave her handout on limiting gas producing foods from up-to-date.  I did order the colonoscopy as discussed with Dr. Anthony but will wait and see how she does with the papaya enzyme and Xifaxan.Recommendations included colonoscopy, Xifaxan, papaya and kiwi enzyme, peppermint oil capsule, water, follow-up in 3 months, no gum or carbonated beverages.UPDATE BY TOMY PIRES 6/13/24:Ms. Mccullough presents to clinic today with her  to discuss her GI symptoms- bloating and intermittent constipation. Patient does have dementia but her  helps with the questions.They did not try Xifaxan, papaya or kiwi enzyme, Miralax, stool softener, etc. Please see previous work-up as above including EGD and CT AP. Also, we discussed a colonoscopy but as she is 88 years old they prefer to defer, I agree.She has a bowel movement about 3 times a week.  No melena, hematochezia, hematemesis, coffee-ground emesis, dysphagia, odynophagia, early satiety, rectal pain, abdominal pain, unintentional weight loss.We discussed at least trying Xifaxan and she is willing. I sent this in for her. I will call in 4 weeks to assess symptoms.
br  Recommendations included Xifaxan samples, try Miralax, stool softener, limit gas producing foods, I will call you for update.
br
br UPDATE BY TOMY IPRES 7/8/24:
br
br Ms. Mccullough presents to clinic today for continued GI symptoms (bloating + constipation), please see extensive work-up and recommendations as above. Her  is out in the car today due to having some diarrhea. 
br
br
She did not  the Xifaxan samples. She really does not want to try this. She did not try MiraLax or stool softener.  As noted above, SIBO test and sucrose breath test were both canceled.  She would like to proceed with colonoscopy.  I will verify this with Dr. Henry as Dr. Anthony is now gone and she is over the age of 87.
br
brNo melena, hematochezia, hematemesis, coffee-ground emesis, dysphagia, odynophagia, unintentional weight loss, diarrhea, rectal pain.  She has a bowel movement every 1-3 days.

## 2024-07-08 NOTE — SERVICENOTES
Dr. Anthony did recommend a colonoscopy and SIBO breath test (canceled). I will verify with Dr. Carl shane since she is over age 87. This is for bloating, unresolved, with worsening constipation. 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

## 2024-08-13 ENCOUNTER — OFFICE (OUTPATIENT)
Dept: URBAN - METROPOLITAN AREA CLINIC 19 | Facility: CLINIC | Age: 89
End: 2024-08-13
Payer: MEDICARE

## 2024-08-13 VITALS
SYSTOLIC BLOOD PRESSURE: 123 MMHG | HEART RATE: 84 BPM | HEIGHT: 62 IN | DIASTOLIC BLOOD PRESSURE: 66 MMHG | OXYGEN SATURATION: 100 % | WEIGHT: 102 LBS

## 2024-08-13 DIAGNOSIS — F03.90 UNSPECIFIED DEMENTIA, UNSPECIFIED SEVERITY, WITHOUT BEHAVIOR: ICD-10-CM

## 2024-08-13 DIAGNOSIS — R14.0 ABDOMINAL DISTENSION (GASEOUS): ICD-10-CM

## 2024-08-13 DIAGNOSIS — Z86.010 PERSONAL HISTORY OF COLONIC POLYPS: ICD-10-CM

## 2024-08-13 DIAGNOSIS — K59.00 CONSTIPATION, UNSPECIFIED: ICD-10-CM

## 2024-08-13 PROCEDURE — 99213 OFFICE O/P EST LOW 20 MIN: CPT | Performed by: NURSE PRACTITIONER

## 2024-08-13 NOTE — SERVICEHPINOTES
Ms. Mccullough is an 10-year-old female with PMH including OA, colon polyps, chronic constipation, chronic HA's.
br
kimi She presents to clinic today with her  for follow-up of constipation and bloating. Please see extensive previous notes and work-up, recommendations (labs, EGD, CT scan, Xifaxan samples, etc). We had talked possibly about a colonoscopy at our last visit but discussed the risks given her age. Dr. Henry agreed and did not want to proceed with a colonoscopy. 
br
kimi She is not willing to do a lactulose breath test. She is willing to try Miralax, I gave her samples in the office and explained how to use this. Her  understands as well. She still has constipation, having a BM twice per week that is small volume. She eats three meals a day and sometimes a snack.
br
br No melena, hematochezia, hematemesis, coffee-ground emesis, dysphagia, odynophagia, unintentional weight loss, anorexia, early satiety, rectal pain, abdominal pain, diarrhea.

## 2025-01-30 ENCOUNTER — OFFICE (OUTPATIENT)
Dept: URBAN - METROPOLITAN AREA CLINIC 19 | Facility: CLINIC | Age: 89
End: 2025-01-30
Payer: MEDICARE

## 2025-01-30 VITALS
OXYGEN SATURATION: 96 % | SYSTOLIC BLOOD PRESSURE: 118 MMHG | WEIGHT: 109 LBS | DIASTOLIC BLOOD PRESSURE: 80 MMHG | HEIGHT: 62 IN | HEART RATE: 64 BPM

## 2025-01-30 DIAGNOSIS — R14.0 ABDOMINAL DISTENSION (GASEOUS): ICD-10-CM

## 2025-01-30 DIAGNOSIS — R19.4 CHANGE IN BOWEL HABIT: ICD-10-CM

## 2025-01-30 PROCEDURE — 99213 OFFICE O/P EST LOW 20 MIN: CPT | Performed by: INTERNAL MEDICINE

## 2025-06-19 ENCOUNTER — OFFICE (OUTPATIENT)
Dept: URBAN - METROPOLITAN AREA CLINIC 19 | Facility: CLINIC | Age: 89
End: 2025-06-19
Payer: MEDICARE

## 2025-06-19 VITALS
DIASTOLIC BLOOD PRESSURE: 69 MMHG | HEIGHT: 62 IN | OXYGEN SATURATION: 98 % | SYSTOLIC BLOOD PRESSURE: 133 MMHG | HEART RATE: 90 BPM | WEIGHT: 107 LBS

## 2025-06-19 DIAGNOSIS — R11.0 NAUSEA: ICD-10-CM

## 2025-06-19 DIAGNOSIS — K59.00 CONSTIPATION, UNSPECIFIED: ICD-10-CM

## 2025-06-19 DIAGNOSIS — F03.90 UNSPECIFIED DEMENTIA, UNSPECIFIED SEVERITY, WITHOUT BEHAVIOR: ICD-10-CM

## 2025-06-19 PROCEDURE — 99213 OFFICE O/P EST LOW 20 MIN: CPT | Performed by: NURSE PRACTITIONER

## 2025-06-19 NOTE — SERVICEHPINOTES
Ms. Mccullough is an 10-year-old female with PMH including dementia, chronic constipation. br
kimi She presents to clinic today for follow-up constipation. She is with her . She is not taking lactulose but maybe once a week. We did discuss trying it daily to every other day. She states when she takes it she does have a bowel movement. She does have dementia and her  helps with the history taking. She has some mild nausea and this is not new. She still has bloating in the abdomen. No melena, hematochezia, hematemesis, coffee-ground emesis, dysphagia, anorexia, unintentional weight loss, vomiting, rectal pain, abdominal pain, fever. They do not want to proceed with the barium enema. Please see previous notes and work-up, recommendations (labs, EGD, CT scan, Xifaxan samples, etc).

## 2025-08-19 ENCOUNTER — OFFICE (OUTPATIENT)
Dept: URBAN - METROPOLITAN AREA CLINIC 19 | Facility: CLINIC | Age: 89
End: 2025-08-19
Payer: MEDICARE

## 2025-08-19 VITALS
SYSTOLIC BLOOD PRESSURE: 124 MMHG | DIASTOLIC BLOOD PRESSURE: 64 MMHG | WEIGHT: 105 LBS | OXYGEN SATURATION: 99 % | HEIGHT: 62 IN | HEART RATE: 84 BPM

## 2025-08-19 DIAGNOSIS — R14.0 ABDOMINAL DISTENSION (GASEOUS): ICD-10-CM

## 2025-08-19 DIAGNOSIS — K59.00 CONSTIPATION, UNSPECIFIED: ICD-10-CM

## 2025-08-19 PROCEDURE — 99214 OFFICE O/P EST MOD 30 MIN: CPT | Performed by: NURSE PRACTITIONER
